# Patient Record
Sex: FEMALE | Race: BLACK OR AFRICAN AMERICAN | NOT HISPANIC OR LATINO
[De-identification: names, ages, dates, MRNs, and addresses within clinical notes are randomized per-mention and may not be internally consistent; named-entity substitution may affect disease eponyms.]

---

## 2017-04-24 ENCOUNTER — TRANSCRIPTION ENCOUNTER (OUTPATIENT)
Age: 27
End: 2017-04-24

## 2019-01-03 ENCOUNTER — APPOINTMENT (OUTPATIENT)
Dept: OPHTHALMOLOGY | Facility: CLINIC | Age: 29
End: 2019-01-03
Payer: COMMERCIAL

## 2019-01-03 PROCEDURE — 92285 EXTERNAL OCULAR PHOTOGRAPHY: CPT

## 2019-01-03 PROCEDURE — 99202 OFFICE O/P NEW SF 15 MIN: CPT

## 2019-01-03 PROCEDURE — 65430 CORNEAL SMEAR: CPT | Mod: RT

## 2019-01-03 RX ORDER — DOXYCYCLINE HYCLATE 50 MG/1
50 CAPSULE ORAL TWICE DAILY
Qty: 1 | Refills: 5 | Status: ACTIVE | COMMUNITY
Start: 2019-01-03 | End: 1900-01-01

## 2019-01-04 ENCOUNTER — APPOINTMENT (OUTPATIENT)
Dept: OPHTHALMOLOGY | Facility: CLINIC | Age: 29
End: 2019-01-04
Payer: COMMERCIAL

## 2019-01-04 PROCEDURE — 99202 OFFICE O/P NEW SF 15 MIN: CPT

## 2019-01-07 ENCOUNTER — APPOINTMENT (OUTPATIENT)
Dept: OPHTHALMOLOGY | Facility: CLINIC | Age: 29
End: 2019-01-07
Payer: COMMERCIAL

## 2019-01-07 LAB
MISCELLANEOUS TEST: NORMAL
PROC NAME: NORMAL

## 2019-01-07 PROCEDURE — 99212 OFFICE O/P EST SF 10 MIN: CPT

## 2019-01-10 ENCOUNTER — APPOINTMENT (OUTPATIENT)
Dept: OPHTHALMOLOGY | Facility: CLINIC | Age: 29
End: 2019-01-10
Payer: COMMERCIAL

## 2019-01-10 PROCEDURE — 92012 INTRM OPH EXAM EST PATIENT: CPT

## 2019-01-11 LAB — BACTERIA EYE AEROBE CULT: NORMAL

## 2019-01-14 LAB — VIRAL CULTURE, GENERAL: NORMAL

## 2019-01-22 ENCOUNTER — APPOINTMENT (OUTPATIENT)
Dept: OPHTHALMOLOGY | Facility: CLINIC | Age: 29
End: 2019-01-22
Payer: COMMERCIAL

## 2019-01-22 PROCEDURE — 92012 INTRM OPH EXAM EST PATIENT: CPT

## 2019-01-31 ENCOUNTER — APPOINTMENT (OUTPATIENT)
Dept: OPHTHALMOLOGY | Facility: CLINIC | Age: 29
End: 2019-01-31
Payer: COMMERCIAL

## 2019-01-31 PROCEDURE — 92012 INTRM OPH EXAM EST PATIENT: CPT

## 2019-02-02 LAB — FUNGUS SPEC CULT ORG #8: NORMAL

## 2019-02-26 ENCOUNTER — APPOINTMENT (OUTPATIENT)
Dept: OPHTHALMOLOGY | Facility: CLINIC | Age: 29
End: 2019-02-26
Payer: COMMERCIAL

## 2019-02-26 DIAGNOSIS — H16.011 CENTRAL CORNEAL ULCER, RIGHT EYE: ICD-10-CM

## 2019-02-26 PROCEDURE — 92012 INTRM OPH EXAM EST PATIENT: CPT

## 2019-05-13 ENCOUNTER — EMERGENCY (EMERGENCY)
Facility: HOSPITAL | Age: 29
LOS: 1 days | Discharge: HOME | End: 2019-05-13
Attending: EMERGENCY MEDICINE
Payer: COMMERCIAL

## 2019-05-13 VITALS
DIASTOLIC BLOOD PRESSURE: 76 MMHG | OXYGEN SATURATION: 99 % | RESPIRATION RATE: 18 BRPM | SYSTOLIC BLOOD PRESSURE: 112 MMHG | TEMPERATURE: 98 F | HEART RATE: 71 BPM

## 2019-05-13 VITALS
RESPIRATION RATE: 16 BRPM | SYSTOLIC BLOOD PRESSURE: 116 MMHG | HEIGHT: 65 IN | TEMPERATURE: 98 F | HEART RATE: 59 BPM | DIASTOLIC BLOOD PRESSURE: 77 MMHG | WEIGHT: 199.96 LBS | OXYGEN SATURATION: 100 %

## 2019-05-13 PROCEDURE — 99283 EMERGENCY DEPT VISIT LOW MDM: CPT

## 2019-05-13 RX ORDER — IBUPROFEN 200 MG
400 TABLET ORAL ONCE
Refills: 0 | Status: COMPLETED | OUTPATIENT
Start: 2019-05-13 | End: 2019-05-13

## 2019-05-13 RX ORDER — ACETAMINOPHEN 500 MG
975 TABLET ORAL ONCE
Refills: 0 | Status: COMPLETED | OUTPATIENT
Start: 2019-05-13 | End: 2019-05-13

## 2019-05-13 RX ORDER — DIAZEPAM 5 MG
5 TABLET ORAL ONCE
Refills: 0 | Status: DISCONTINUED | OUTPATIENT
Start: 2019-05-13 | End: 2019-05-13

## 2019-05-13 RX ORDER — LIDOCAINE 4 G/100G
1 CREAM TOPICAL ONCE
Refills: 0 | Status: COMPLETED | OUTPATIENT
Start: 2019-05-13 | End: 2019-05-13

## 2019-05-13 RX ADMIN — Medication 400 MILLIGRAM(S): at 22:39

## 2019-05-13 RX ADMIN — LIDOCAINE 1 PATCH: 4 CREAM TOPICAL at 22:06

## 2019-05-13 RX ADMIN — Medication 5 MILLIGRAM(S): at 22:40

## 2019-05-13 RX ADMIN — Medication 975 MILLIGRAM(S): at 22:06

## 2019-05-13 NOTE — ED PROVIDER NOTE - NSFOLLOWUPINSTRUCTIONS_ED_ALL_ED_FT
Please call or see your doctor or return to the ER if you have new chest pain, shortness of breath, fevers, inability to eat or drink, or worsening of symptoms that brought you to the emergency room today.    Take tylenol and ibuprofen as needed for pain.  Please follow up with your primary care physician in 1-2 days or as soon as possible. Please call or see your doctor or return to the ER if you have new chest pain, shortness of breath, fevers, inability to eat or drink, or worsening of symptoms that brought you to the emergency room today.    Take tylenol and ibuprofen as needed for pain; you can take one of these medications every 3 hours around the clock if needed.  Please follow up with your primary care physician in 1-2 days or as soon as possible.    You should resume normal activity as tolerated.

## 2019-05-13 NOTE — ED ADULT NURSE NOTE - CHPI ED NUR SYMPTOMS NEG
no neck tenderness/no fatigue/no bladder dysfunction/no constipation/no motor function loss/no bowel dysfunction/no difficulty bearing weight/no tingling/no numbness/no anorexia

## 2019-05-13 NOTE — ED ADULT NURSE NOTE - OBJECTIVE STATEMENT
27 yo F no pmh ambulated to ED c/o upper generalized back pain/pressure radiating to the shoulders and chest bilaterally.  Pt states onset of pain happened 3-4 days ago.  Denies any recent trauma or heavy lifting.  Pt states that she has had back pain in the past, states it feels similar, but pain intermittently comes and goes.  Denies anything that makes the pain better or worse.  Denies SOB, fevers/chills, n/v/d. A&Ox4, VSS, skin w/d/i.  NAD.  Safety and comfort maintained.  SO at bedside.  Will continue to monitor.

## 2019-05-13 NOTE — ED PROVIDER NOTE - CLINICAL SUMMARY MEDICAL DECISION MAKING FREE TEXT BOX
Pt p/w back pain likely musculoskeletal In nature given symptomatology and no red flag sx no significant trauma to the back. Plan: analgesia, reassess

## 2019-05-13 NOTE — ED PROVIDER NOTE - ATTENDING CONTRIBUTION TO CARE
I have seen and evaluated this patient with the resident.   I agree with the findings  unless other wise stated.  I have made appropriate changes in documentations where needed, After my face to face bedside evaluation, I am further  notin F p/w atraumatic back pain x 3 days. Pain starts intermittently, lasts seconds at a time described as  "pressure" and stops her from doing whatever she is doing at the time, helps to lie on her side. has been trying meloxicam and flexaril nightly w/o relief. Pain is occasionally so bad that she has some difficulty breathing through the episodes but otherwise has had no SoB or chest pain. Denies recent f/c n/v/d saddle numbness, weakness of legs, urinary or stool or incontinence, drug use. Back no rash no deformity intact neurologically ambulatory likely musculo skeletal pain outpatient follow up -- Abram

## 2019-05-13 NOTE — ED PROVIDER NOTE - OBJECTIVE STATEMENT
28 F p/w atraumatic back pain x 3 days. Pain starts intermittently, lasts seconds at a time described as  "pressure" and stops her from doing whatever she is doing at the time, helps to lie on her side. has been trying meloxicam and flexaril nightly w/o relief. Pain is occasionally so bad that she has some difficulty breathing through the episodes but otherwise has had no SoB or chest pain. Denies recent f/c n/v/d saddle numbness, weakness of legs, urinary or stool or incontinence, drug use.    Fell a week ago but landed on her leg denies any back pain from that fall

## 2019-05-13 NOTE — ED PROVIDER NOTE - MUSCULOSKELETAL, MLM
No vertebral tenderness or paraspinal tenderness. Hip flex/extension 5/5 b/l, knee flex/extension 5/5 b/l, sensation intact to light touch b/l in the lower ext. Plantar and dorsi flexion wnl and bilateral DP pusles are palpable. Negative SLR test

## 2019-08-29 ENCOUNTER — APPOINTMENT (OUTPATIENT)
Dept: OPHTHALMOLOGY | Facility: CLINIC | Age: 29
End: 2019-08-29

## 2020-04-26 ENCOUNTER — MESSAGE (OUTPATIENT)
Age: 30
End: 2020-04-26

## 2020-05-08 LAB
SARS-COV-2 IGG SERPL IA-ACNC: 0 INDEX
SARS-COV-2 IGG SERPL QL IA: NEGATIVE

## 2021-05-24 ENCOUNTER — OFFICE VISIT (OUTPATIENT)
Dept: URBAN - METROPOLITAN AREA TELEHEALTH 2 | Facility: TELEHEALTH | Age: 31
End: 2021-05-24
Payer: COMMERCIAL

## 2021-05-24 ENCOUNTER — DASHBOARD ENCOUNTERS (OUTPATIENT)
Age: 31
End: 2021-05-24

## 2021-05-24 DIAGNOSIS — R19.4 CHANGE IN BOWEL HABITS: ICD-10-CM

## 2021-05-24 DIAGNOSIS — K59.01 CONSTIPATION: ICD-10-CM

## 2021-05-24 DIAGNOSIS — R11.0 NAUSEA: ICD-10-CM

## 2021-05-24 DIAGNOSIS — K21.9 GERD (GASTROESOPHAGEAL REFLUX DISEASE): ICD-10-CM

## 2021-05-24 DIAGNOSIS — K58.9 IBS (IRRITABLE BOWEL SYNDROME): ICD-10-CM

## 2021-05-24 DIAGNOSIS — Z34.90 PREGNANCY: ICD-10-CM

## 2021-05-24 PROBLEM — 235595009 GASTROESOPHAGEAL REFLUX DISEASE: Status: ACTIVE | Noted: 2021-05-24

## 2021-05-24 PROBLEM — 14760008 CONSTIPATION: Status: ACTIVE | Noted: 2021-05-24

## 2021-05-24 PROBLEM — 10743008 IRRITABLE BOWEL SYNDROME: Status: ACTIVE | Noted: 2021-05-24

## 2021-05-24 PROCEDURE — 99204 OFFICE O/P NEW MOD 45 MIN: CPT | Performed by: INTERNAL MEDICINE

## 2021-05-24 RX ORDER — FAMOTIDINE 40 MG/1
1 TAB TABLET, FILM COATED ORAL BID
Qty: 60 TABLET | Refills: 11 | OUTPATIENT
Start: 2021-05-24

## 2021-05-24 RX ORDER — PANTOPRAZOLE SODIUM 40 MG/1
1 TABLET TABLET, DELAYED RELEASE ORAL ONCE A DAY
Qty: 30 TABLET | Refills: 3 | OUTPATIENT
Start: 2021-05-24

## 2021-05-24 NOTE — HPI-TODAY'S VISIT:
Pt is here for evaluation of constipation. . Pt has constipation since 2014, since pregnancy. . Today on 5/24/21, pt reports she has taken miralax.  She has 2 BM per week or sometimes up to 1 per 2 weeks.  She has a lot bloating and nausea sxs, no vomiting.  Has a sense of fullness.  No blood in the stool. No weight loss.  Saw GYN doctor, gave her benefiber and miralax.  Then she increased the dose to 4 caps, which did work, but since then is having a burning sensation in her abd area.  has GERD, on OTC omepraolze 20mg . PMH: None FH: No GI malignancy SH: occasional smoke/etoh; clerical work

## 2024-10-02 ENCOUNTER — OFFICE VISIT (OUTPATIENT)
Dept: URBAN - METROPOLITAN AREA CLINIC 96 | Facility: CLINIC | Age: 34
End: 2024-10-02
Payer: COMMERCIAL

## 2024-10-02 VITALS
WEIGHT: 230 LBS | TEMPERATURE: 97.7 F | DIASTOLIC BLOOD PRESSURE: 78 MMHG | HEIGHT: 65 IN | BODY MASS INDEX: 38.32 KG/M2 | HEART RATE: 51 BPM | SYSTOLIC BLOOD PRESSURE: 121 MMHG

## 2024-10-02 DIAGNOSIS — R13.19 ESOPHAGEAL DYSPHAGIA: ICD-10-CM

## 2024-10-02 DIAGNOSIS — R11.10 VOMITING, UNSPECIFIED VOMITING TYPE, UNSPECIFIED WHETHER NAUSEA PRESENT: ICD-10-CM

## 2024-10-02 DIAGNOSIS — K21.9 GASTROESOPHAGEAL REFLUX DISEASE, UNSPECIFIED WHETHER ESOPHAGITIS PRESENT: ICD-10-CM

## 2024-10-02 DIAGNOSIS — R10.13 DYSPEPSIA: ICD-10-CM

## 2024-10-02 PROBLEM — 235595009: Status: ACTIVE | Noted: 2024-10-02

## 2024-10-02 PROCEDURE — 99204 OFFICE O/P NEW MOD 45 MIN: CPT | Performed by: INTERNAL MEDICINE

## 2024-10-02 RX ORDER — PANTOPRAZOLE SODIUM 40 MG/1
1 TABLET TABLET, DELAYED RELEASE ORAL ONCE A DAY
Qty: 30 TABLET | Refills: 3 | Status: DISCONTINUED | COMMUNITY
Start: 2021-05-24

## 2024-10-02 RX ORDER — FAMOTIDINE 40 MG/1
1 TAB TABLET, FILM COATED ORAL BID
Qty: 60 TABLET | Refills: 11 | Status: DISCONTINUED | COMMUNITY
Start: 2021-05-24

## 2024-10-02 RX ORDER — PANTOPRAZOLE SODIUM 40 MG/1
1 TABLET TABLET, DELAYED RELEASE ORAL ONCE A DAY
Qty: 90 TABLET | Refills: 4 | OUTPATIENT
Start: 2024-10-02

## 2024-10-02 RX ORDER — MELOXICAM 15 MG/1
TAKE ONE TABLET BY MOUTH ONE TIME DAILY TABLET ORAL
Qty: 30 UNSPECIFIED | Refills: 0 | Status: DISCONTINUED | COMMUNITY

## 2024-10-02 RX ORDER — FAMOTIDINE 20 MG/1
1 TABLET AT BEDTIME AS NEEDED TABLET, FILM COATED ORAL ONCE A DAY
Status: ACTIVE | COMMUNITY

## 2024-10-02 NOTE — HPI-TODAY'S VISIT:
34-year-old female previously remotely seen by Dr. Goldstein via telehealth 5/20/2021 for evaluation of constipation.  Reported reflux, taking omeprazole 20 mg daily.  Patient was currently pregnant at the time of prior visit with Dr. Goldstein, hold off on any imaging or endoscopy evaluation given such.  He had recommended senna and magnesium as well as Pepcid and Protonix if cleared with OB.  Daughter age 10, son age 2, both C section delivery.   Post partum went to outside GI practice and EGD was rec and was not done due to expense. Now reports having worsening GERD sx such as regurgitation. 2 weeks ago awoke regurgitating.  Does reports occasional difficulty with swallowing. No chronic NSAIDs. No melena, no rectal bleeding, no unintentional weight loss. No prior EGD prior. Taking OTC famotidine which helps minimally for the past 2 years, prior omeprazole did not help. No relief with Tums. No prior response to esomeprazole. No early satiety.   No family history of GI CA.   No hx of anesthesia problems, no cardiac issues or renal issues. No chance currently pregnant.  Patient requests rx for pantoprazole given was helpful prior.

## 2024-11-25 ENCOUNTER — OFFICE VISIT (OUTPATIENT)
Dept: URBAN - METROPOLITAN AREA MEDICAL CENTER 28 | Facility: MEDICAL CENTER | Age: 34
End: 2024-11-25

## 2024-12-10 ENCOUNTER — OFFICE VISIT (OUTPATIENT)
Dept: URBAN - METROPOLITAN AREA CLINIC 96 | Facility: CLINIC | Age: 34
End: 2024-12-10
Payer: COMMERCIAL

## 2024-12-10 VITALS
BODY MASS INDEX: 36.65 KG/M2 | SYSTOLIC BLOOD PRESSURE: 118 MMHG | DIASTOLIC BLOOD PRESSURE: 88 MMHG | WEIGHT: 220 LBS | HEART RATE: 51 BPM | TEMPERATURE: 97.7 F | HEIGHT: 65 IN

## 2024-12-10 DIAGNOSIS — K21.9 GASTROESOPHAGEAL REFLUX DISEASE, UNSPECIFIED WHETHER ESOPHAGITIS PRESENT: ICD-10-CM

## 2024-12-10 DIAGNOSIS — R11.10 VOMITING, UNSPECIFIED VOMITING TYPE, UNSPECIFIED WHETHER NAUSEA PRESENT: ICD-10-CM

## 2024-12-10 DIAGNOSIS — R13.19 ESOPHAGEAL DYSPHAGIA: ICD-10-CM

## 2024-12-10 PROCEDURE — 99214 OFFICE O/P EST MOD 30 MIN: CPT | Performed by: INTERNAL MEDICINE

## 2024-12-10 RX ORDER — PANTOPRAZOLE SODIUM 40 MG/1
1 TABLET TABLET, DELAYED RELEASE ORAL ONCE A DAY
Qty: 90 TABLET | Refills: 4 | OUTPATIENT

## 2024-12-10 RX ORDER — FAMOTIDINE 20 MG/1
1 TABLET AT BEDTIME AS NEEDED TABLET, FILM COATED ORAL ONCE A DAY
Status: DISCONTINUED | COMMUNITY

## 2024-12-10 RX ORDER — PANTOPRAZOLE SODIUM 40 MG/1
1 TABLET TABLET, DELAYED RELEASE ORAL ONCE A DAY
Qty: 90 TABLET | Refills: 4 | Status: DISCONTINUED | COMMUNITY
Start: 2024-10-02

## 2024-12-10 NOTE — PHYSICAL EXAM GASTROINTESTINAL
Abdomen , soft, scars,  nontender, nondistended , no guarding or rigidity , no masses palpable , normal bowel sounds , Liver and Spleen,  no hepatosplenomegaly , liver nontender Spine appears normal, movement of extremities grossly intact.

## 2024-12-10 NOTE — HPI-TODAY'S VISIT:
34-year-old female seen 10/2/2024.   As noted prior, remotely seen by Dr. Goldstein via telehealth 5/20/2021 for evaluation of constipation.  Reported reflux, taking omeprazole 20 mg daily.  Patient was currently pregnant at the time of prior visit with Dr. Goldstein, hold off on any imaging or endoscopy evaluation given such. He had recommended senna and magnesium as well as Pepcid and Protonix if cleared with OB.   Daughter age 10, son age 2, both C section delivery.   As noted prior, post partum went to outside GI practice and EGD was rec and was not done due to expense. Was reporting worsening GERD sx such as regurgitation, occasional difficulty with swallowing. No chronic NSAIDs. No melena, no rectal bleeding, no unintentional weight loss. Was taking OTC famotidine which helped minimally for the past 2 years, prior omeprazole did not help. No relief with Tums. No prior response to esomeprazole. No early satiety.   No family history of GI CA.  Previously provided rx for pantoprazole given was helpful prior.  EGD 11/25/2024 with normal duodenal bx, no H pylori, normal upper and lower esophageal biopsies.   She reports did not  the previously prescribed pantoprazole.

## 2025-01-27 ENCOUNTER — OFFICE VISIT (OUTPATIENT)
Dept: URBAN - METROPOLITAN AREA TELEHEALTH 2 | Facility: TELEHEALTH | Age: 35
End: 2025-01-27

## 2025-01-27 RX ORDER — PANTOPRAZOLE SODIUM 40 MG/1
1 TABLET TABLET, DELAYED RELEASE ORAL ONCE A DAY
Qty: 90 TABLET | Refills: 4 | Status: ACTIVE | COMMUNITY